# Patient Record
Sex: FEMALE | Race: WHITE | URBAN - METROPOLITAN AREA
[De-identification: names, ages, dates, MRNs, and addresses within clinical notes are randomized per-mention and may not be internally consistent; named-entity substitution may affect disease eponyms.]

---

## 2021-07-13 NOTE — PATIENT DISCUSSION
Pt. [facility] last [facility], I've asked her to obtain a copy of her examination and then see Dr. Burgess Almodovar.

## 2021-07-23 NOTE — PATIENT DISCUSSION
Discussed the R/B/A of epi-lasek and patient wishes to proceed with epi-lasek jasmyn OU. Discussed with patient the healing time of Epi-lasek. Patient understands that it can take months to heal and vision can fluctuate during the healing process.

## 2021-08-06 NOTE — PATIENT DISCUSSION
Goal: Epi-Lasek Mirella OU with [facility].

## 2022-01-28 ENCOUNTER — FOLLOW UP (OUTPATIENT)
Dept: URBAN - METROPOLITAN AREA CLINIC 19 | Facility: CLINIC | Age: 77
End: 2022-01-28

## 2022-01-28 DIAGNOSIS — H35.373: ICD-10-CM

## 2022-01-28 PROCEDURE — 92134 CPTRZ OPH DX IMG PST SGM RTA: CPT

## 2022-01-28 PROCEDURE — 92014 COMPRE OPH EXAM EST PT 1/>: CPT

## 2022-01-28 ASSESSMENT — TONOMETRY
OD_IOP_MMHG: 14
OS_IOP_MMHG: 12

## 2022-01-28 ASSESSMENT — VISUAL ACUITY
OD_SC: 20/30
OS_SC: 20/70

## 2022-01-28 NOTE — PATIENT DISCUSSION
Visually significant PCO present on exam today. Cannot improve vision with refraction/glasses at this time. Discussed treatment options including observation versus Yag capsulotomy. Laser recommended to improve vision. We discussed the risks/benefits of laser capsulotomy. All questions were answered. Patient elects to proceed. Schedule Yag capsulotomy OOS.

## 2022-02-10 ENCOUNTER — CLINIC PROCEDURE ONLY (OUTPATIENT)
Dept: URBAN - METROPOLITAN AREA CLINIC 20 | Facility: CLINIC | Age: 77
End: 2022-02-10

## 2022-02-10 DIAGNOSIS — H26.493: ICD-10-CM

## 2022-02-10 DIAGNOSIS — H35.373: ICD-10-CM

## 2022-02-10 PROCEDURE — 92014 COMPRE OPH EXAM EST PT 1/>: CPT

## 2022-02-10 PROCEDURE — 66821 AFTER CATARACT LASER SURGERY: CPT

## 2022-02-10 ASSESSMENT — VISUAL ACUITY
OD_SC: 20/40-2
OU_SC: J5
OS_PH: 20/60-1
OD_PH: 20/40+2
OS_SC: 20/100

## 2022-02-10 ASSESSMENT — KERATOMETRY
OS_AXISANGLE_DEGREES: 97
OS_K1POWER_DIOPTERS: 44.75
OD_AXISANGLE2_DEGREES: 177
OD_K1POWER_DIOPTERS: 44.00
OS_AXISANGLE2_DEGREES: 7
OD_K2POWER_DIOPTERS: 44.50
OD_AXISANGLE_DEGREES: 87
OS_K2POWER_DIOPTERS: 45.00

## 2022-02-10 ASSESSMENT — TONOMETRY
OD_IOP_MMHG: 10
OS_IOP_MMHG: 10

## 2022-02-10 NOTE — PROCEDURE NOTE: CLINICAL
PROCEDURE NOTE: YAG Capsulotomy OS. Diagnosis: Posterior Capsular Opacity. Anesthesia: Topical. Prior to commencing surgery patient identification, surgical procedure, site, and side were confirmed by Dr. Pawan Lewis. Following topical proparacaine anesthesia, the patient was positioned at the YAG laser, a contact lens coupled to the cornea with methylcellulose and an axial posterior performed capsulotomy without complication  . Excess methylcellulose was washed from the eye. One drop of Alphagan was instilled and the patient returned to the holding area having tolerated the procedure well and without complication. Fuentes Antoine

## 2022-03-14 ENCOUNTER — CLINIC PROCEDURE ONLY (OUTPATIENT)
Dept: URBAN - METROPOLITAN AREA CLINIC 20 | Facility: CLINIC | Age: 77
End: 2022-03-14

## 2022-03-14 DIAGNOSIS — Z98.890: ICD-10-CM

## 2022-03-14 DIAGNOSIS — H26.491: ICD-10-CM

## 2022-03-14 PROCEDURE — 99024 POSTOP FOLLOW-UP VISIT: CPT

## 2022-03-14 PROCEDURE — 66821 AFTER CATARACT LASER SURGERY: CPT

## 2022-03-14 ASSESSMENT — KERATOMETRY
OD_AXISANGLE2_DEGREES: 177
OS_K1POWER_DIOPTERS: 44.75
OS_AXISANGLE2_DEGREES: 7
OD_K1POWER_DIOPTERS: 44.00
OS_AXISANGLE_DEGREES: 97
OS_K2POWER_DIOPTERS: 45.00
OD_K2POWER_DIOPTERS: 44.50
OD_AXISANGLE_DEGREES: 87

## 2022-03-14 ASSESSMENT — TONOMETRY
OS_IOP_MMHG: 13
OD_IOP_MMHG: 10

## 2022-03-14 ASSESSMENT — VISUAL ACUITY
OD_SC: 20/40-2
OS_SC: 20/30+2

## 2022-03-14 NOTE — PROCEDURE NOTE: CLINICAL
PROCEDURE NOTE: YAG Capsulotomy OD. Diagnosis: Posterior Capsular Opacity. Anesthesia: Topical. Prior to commencing surgery patient identification, surgical procedure, site, and side were confirmed by Dr. Dusty Frye. Following topical proparacaine anesthesia, the patient was positioned at the YAG laser, a contact lens coupled to the cornea with methylcellulose and an axial posterior performed capsulotomy without complication . Excess methylcellulose was washed from the eye. One drop of Alphagan was instilled and the patient returned to the holding area having tolerated the procedure well and without complication. Kvng Ann

## 2022-04-29 ENCOUNTER — POST-OP (OUTPATIENT)
Dept: URBAN - METROPOLITAN AREA CLINIC 19 | Facility: CLINIC | Age: 77
End: 2022-04-29

## 2022-04-29 DIAGNOSIS — H16.223: ICD-10-CM

## 2022-04-29 DIAGNOSIS — Z98.890: ICD-10-CM

## 2022-04-29 PROCEDURE — 99024 POSTOP FOLLOW-UP VISIT: CPT

## 2022-04-29 PROCEDURE — 68761 CLOSE TEAR DUCT OPENING: CPT

## 2022-04-29 ASSESSMENT — TONOMETRY
OS_IOP_MMHG: 12
OD_IOP_MMHG: 12

## 2022-04-29 ASSESSMENT — VISUAL ACUITY
OS_SC: 20/25-2
OD_SC: 20/30-3

## 2022-04-29 NOTE — PROCEDURE NOTE: CLINICAL
PROCEDURE NOTE: Punctal Plugs, Extended Bilateral Lower Lids. Diagnosis: Keratoconjunctivitis Sicca, Not Specified As Sjögren's. Prior to treatment, the risks/benefits/alternatives were discussed. The patient wished to proceed with procedure. Patient tolerated procedure well. There were no complications. Post procedure instructions given. Hernandez Araujo

## 2023-06-14 ENCOUNTER — ESTABLISHED PATIENT (OUTPATIENT)
Dept: URBAN - METROPOLITAN AREA CLINIC 19 | Facility: CLINIC | Age: 78
End: 2023-06-14

## 2023-06-14 DIAGNOSIS — H04.123: ICD-10-CM

## 2023-06-14 DIAGNOSIS — H20.00: ICD-10-CM

## 2023-06-14 PROCEDURE — 99213 OFFICE O/P EST LOW 20 MIN: CPT

## 2023-06-14 RX ORDER — FLUOROMETHOLONE 1 MG/ML: 1 SUSPENSION/ DROPS OPHTHALMIC

## 2023-06-15 ASSESSMENT — TONOMETRY
OS_IOP_MMHG: 10
OD_IOP_MMHG: 10

## 2023-06-15 ASSESSMENT — VISUAL ACUITY
OD_SC: 20/25
OS_SC: 20/20

## 2023-06-22 ENCOUNTER — ESTABLISHED PATIENT (OUTPATIENT)
Dept: URBAN - METROPOLITAN AREA CLINIC 19 | Facility: CLINIC | Age: 78
End: 2023-06-22

## 2023-06-22 DIAGNOSIS — H20.00: ICD-10-CM

## 2023-06-22 PROCEDURE — 99213 OFFICE O/P EST LOW 20 MIN: CPT

## 2023-06-22 ASSESSMENT — VISUAL ACUITY
OS_SC: 20/25
OD_SC: 20/30-2

## 2023-06-22 ASSESSMENT — TONOMETRY
OD_IOP_MMHG: 13
OS_IOP_MMHG: 14

## 2023-07-14 ENCOUNTER — ESTABLISHED PATIENT (OUTPATIENT)
Dept: URBAN - METROPOLITAN AREA CLINIC 19 | Facility: CLINIC | Age: 78
End: 2023-07-14

## 2023-07-14 DIAGNOSIS — H16.223: ICD-10-CM

## 2023-07-14 DIAGNOSIS — H20.00: ICD-10-CM

## 2023-07-14 PROCEDURE — 99213 OFFICE O/P EST LOW 20 MIN: CPT

## 2023-07-14 PROCEDURE — 68761 CLOSE TEAR DUCT OPENING: CPT

## 2023-07-14 ASSESSMENT — TONOMETRY
OD_IOP_MMHG: 10
OS_IOP_MMHG: 10

## 2023-07-14 ASSESSMENT — VISUAL ACUITY
OS_SC: 20/20
OU_SC: 20/30-1
OD_SC: 20/20-2

## 2024-01-17 ENCOUNTER — ESTABLISHED PATIENT (OUTPATIENT)
Dept: URBAN - METROPOLITAN AREA CLINIC 19 | Facility: CLINIC | Age: 79
End: 2024-01-17

## 2024-01-17 DIAGNOSIS — H04.123: ICD-10-CM

## 2024-01-17 DIAGNOSIS — H02.883: ICD-10-CM

## 2024-01-17 DIAGNOSIS — H52.4: ICD-10-CM

## 2024-01-17 DIAGNOSIS — H35.373: ICD-10-CM

## 2024-01-17 DIAGNOSIS — H16.223: ICD-10-CM

## 2024-01-17 DIAGNOSIS — H02.886: ICD-10-CM

## 2024-01-17 PROCEDURE — 92134 CPTRZ OPH DX IMG PST SGM RTA: CPT

## 2024-01-17 PROCEDURE — 92014 COMPRE OPH EXAM EST PT 1/>: CPT

## 2024-01-17 ASSESSMENT — KERATOMETRY
OD_AXISANGLE2_DEGREES: 96
OS_K2POWER_DIOPTERS: 44.5
OD_K1POWER_DIOPTERS: 44.75
OS_AXISANGLE2_DEGREES: 92
OD_AXISANGLE_DEGREES: 6
OD_K2POWER_DIOPTERS: 44.25
OS_AXISANGLE_DEGREES: 2
OS_K1POWER_DIOPTERS: 45.25

## 2024-01-17 ASSESSMENT — TONOMETRY
OS_IOP_MMHG: 8
OD_IOP_MMHG: 8

## 2024-01-17 ASSESSMENT — VISUAL ACUITY
OD_SC: 20/25-1
OS_SC: 20/20-1
OU_CC: 20/20

## 2024-10-08 ENCOUNTER — FOLLOW UP (OUTPATIENT)
Dept: URBAN - METROPOLITAN AREA CLINIC 19 | Facility: CLINIC | Age: 79
End: 2024-10-08

## 2024-10-08 DIAGNOSIS — H02.883: ICD-10-CM

## 2024-10-08 DIAGNOSIS — H02.886: ICD-10-CM

## 2024-10-08 DIAGNOSIS — H16.223: ICD-10-CM

## 2024-10-08 PROCEDURE — 99213 OFFICE O/P EST LOW 20 MIN: CPT

## 2024-10-08 RX ORDER — ERYTHROMYCIN 5 MG/G: OINTMENT OPHTHALMIC EVERY EVENING

## 2025-02-27 ENCOUNTER — COMPREHENSIVE EXAM (OUTPATIENT)
Age: 80
End: 2025-02-27

## 2025-02-27 DIAGNOSIS — H35.373: ICD-10-CM

## 2025-02-27 DIAGNOSIS — H02.886: ICD-10-CM

## 2025-02-27 DIAGNOSIS — H02.883: ICD-10-CM

## 2025-02-27 DIAGNOSIS — H52.4: ICD-10-CM

## 2025-02-27 DIAGNOSIS — H16.223: ICD-10-CM

## 2025-02-27 DIAGNOSIS — H04.123: ICD-10-CM

## 2025-02-27 PROCEDURE — 92134 CPTRZ OPH DX IMG PST SGM RTA: CPT

## 2025-02-27 PROCEDURE — 92014 COMPRE OPH EXAM EST PT 1/>: CPT
